# Patient Record
Sex: FEMALE
[De-identification: names, ages, dates, MRNs, and addresses within clinical notes are randomized per-mention and may not be internally consistent; named-entity substitution may affect disease eponyms.]

---

## 2017-09-13 PROBLEM — E66.01 MORBID OBESITY WITH BODY MASS INDEX (BMI) OF 40.0 TO 49.9 (HCC): Status: ACTIVE | Noted: 2017-09-13

## 2018-07-11 ENCOUNTER — HOSPITAL (OUTPATIENT)
Dept: OTHER | Age: 48
End: 2018-07-11
Attending: EMERGENCY MEDICINE

## 2018-10-07 PROCEDURE — 87086 URINE CULTURE/COLONY COUNT: CPT | Performed by: EMERGENCY MEDICINE

## 2018-10-12 PROCEDURE — 81003 URINALYSIS AUTO W/O SCOPE: CPT | Performed by: INTERNAL MEDICINE

## 2021-07-08 ENCOUNTER — APPOINTMENT (OUTPATIENT)
Dept: GENERAL RADIOLOGY | Age: 51
End: 2021-07-08
Attending: NURSE PRACTITIONER

## 2021-07-08 ENCOUNTER — HOSPITAL ENCOUNTER (OUTPATIENT)
Age: 51
Discharge: HOME | End: 2021-07-08

## 2021-07-08 ENCOUNTER — APPOINTMENT (OUTPATIENT)
Dept: ULTRASOUND IMAGING | Age: 51
End: 2021-07-08
Attending: NURSE PRACTITIONER

## 2021-07-08 VITALS
TEMPERATURE: 98 F | RESPIRATION RATE: 20 BRPM | HEART RATE: 102 BPM | DIASTOLIC BLOOD PRESSURE: 98 MMHG | OXYGEN SATURATION: 97 % | SYSTOLIC BLOOD PRESSURE: 154 MMHG

## 2021-07-08 DIAGNOSIS — S89.91XA INJURY OF RIGHT LOWER EXTREMITY, INITIAL ENCOUNTER: Primary | ICD-10-CM

## 2021-07-08 PROCEDURE — 73590 X-RAY EXAM OF LOWER LEG: CPT | Performed by: NURSE PRACTITIONER

## 2021-07-08 PROCEDURE — 73610 X-RAY EXAM OF ANKLE: CPT | Performed by: NURSE PRACTITIONER

## 2021-07-08 PROCEDURE — 99214 OFFICE O/P EST MOD 30 MIN: CPT

## 2021-07-08 PROCEDURE — 99204 OFFICE O/P NEW MOD 45 MIN: CPT

## 2021-07-08 PROCEDURE — 93971 EXTREMITY STUDY: CPT | Performed by: NURSE PRACTITIONER

## 2021-07-08 RX ORDER — CEFADROXIL 500 MG/1
500 CAPSULE ORAL 2 TIMES DAILY
Qty: 14 CAPSULE | Refills: 0 | Status: SHIPPED | OUTPATIENT
Start: 2021-07-08 | End: 2021-07-15

## 2021-07-08 RX ORDER — TRAMADOL HYDROCHLORIDE 50 MG/1
50 TABLET ORAL ONCE
Status: COMPLETED | OUTPATIENT
Start: 2021-07-08 | End: 2021-07-08

## 2021-07-08 RX ORDER — TRAMADOL HYDROCHLORIDE 50 MG/1
TABLET ORAL EVERY 6 HOURS PRN
Qty: 10 TABLET | Refills: 0 | Status: SHIPPED | OUTPATIENT
Start: 2021-07-08 | End: 2021-07-15

## 2021-07-08 RX ORDER — TRAMADOL HYDROCHLORIDE 50 MG/1
50 TABLET ORAL EVERY 6 HOURS PRN
Status: DISCONTINUED | OUTPATIENT
Start: 2021-07-08 | End: 2021-07-08

## 2021-07-08 NOTE — ED PROVIDER NOTES
Patient Seen in: [facility]      History   Patient presents with:  Fall    Stated Complaint: fall, leg pain    HPI/Subjective:   HPI     51-year-old female presents to the immediate care with complaints of trip and fall almost 1 week ago.   Sh Physical Exam  Vitals and nursing note reviewed. Constitutional:       Appearance: Normal appearance. Cardiovascular:      Rate and Rhythm: Normal rate and regular rhythm. Pulses: Normal pulses. Heart sounds: Normal heart sounds.    Pu Disposition:  Discharge  7/8/2021  4:17 pm    Follow-up:  Kandi Cook MD  [address] [place] [id number]  [phone]    Schedule an appointment as soon as possible for a visit in 3 days  If symptoms worsen follow up sooner

## 2021-07-08 NOTE — ED INITIAL ASSESSMENT (HPI)
PATIENT ARRIVED AMBULATORY TO ROOM. PATIENT STATES SHE WAS TRAVELING IN [place] 6 DAYS AGO WHEN SHE TRIPPED IN HER FLIP FLOPS AND FELL.  PATIENT C/O PAIN TO THE RIGHT SHIN. +BRUISING TO THE SHIN. +REDNESS AND SWELLING TO THE RIGHT LOWER EXTREMITY. +SWELL